# Patient Record
Sex: FEMALE | Race: WHITE
[De-identification: names, ages, dates, MRNs, and addresses within clinical notes are randomized per-mention and may not be internally consistent; named-entity substitution may affect disease eponyms.]

---

## 2018-08-27 ENCOUNTER — HOSPITAL ENCOUNTER (EMERGENCY)
Dept: HOSPITAL 92 - ERS | Age: 33
Discharge: HOME | End: 2018-08-27
Payer: SELF-PAY

## 2018-08-27 DIAGNOSIS — N83.202: Primary | ICD-10-CM

## 2018-08-27 DIAGNOSIS — F17.210: ICD-10-CM

## 2018-08-27 LAB
PREGU CONTROL BACKGROUND?: (no result)
PREGU CONTROL BAR APPEAR?: YES
SP GR UR STRIP: 1.01 (ref 1–1.04)

## 2018-08-27 PROCEDURE — 81025 URINE PREGNANCY TEST: CPT

## 2018-08-27 PROCEDURE — 81003 URINALYSIS AUTO W/O SCOPE: CPT

## 2018-08-27 PROCEDURE — 76856 US EXAM PELVIC COMPLETE: CPT

## 2018-08-27 PROCEDURE — 93976 VASCULAR STUDY: CPT

## 2018-08-27 PROCEDURE — 74176 CT ABD & PELVIS W/O CONTRAST: CPT

## 2018-08-27 NOTE — ULT
ULTRASOUND PELVIC WITH DOPPLER:

 

Date:  08/27/18 

 

HISTORY:  

Left side adnexal mass on recent CT exam. 

 

COMPARISON:  

CT dated 08/27/18. CT exam from 2009. 

 

FINDINGS:

Real-time Gray scale with color flow and spectral analysis of the pelvis performed via the transabdom
inal approach only. The uterus measures 8.8 x 4.1 x 4.7 cm. Endometrial thickness is 9.0 mm. 

 

Right ovary measures 2.7 x 2.6 x 2.2 cm. Left ovary measures 4.6 x 4.3 x 4.5 cm. In the left ovary, t
here is a 4.6 x 3.8 x 4.0 cm cystic mass with multiple thin septations which are less than 3.0 mm. 

 

IMPRESSION: 

Cystic mass left ovary with multiple thin septations. This suggests a neoplasm. Given the thin septat
ions, this may be a benign neoplastic entity. Surgical consultation recommended. 

 

 

POS: [facility]

## 2018-08-27 NOTE — CT
ABDOMEN AND PELVIS CT NONCONTRAST:

 

Date:  08/27/18 

 

Reference made to 05/02/09 CT exam. 

 

INDICATION:

Left flank pain. 

 

FINDINGS:

 

There is a large left adnexal hypodense mass, incompletely assessed by CT imaging, measuring approxim
ately 5.0 cm in diameter. There is no obstructive uropathy, bilaterally. Numerous, scattered granulom
atous calcifications are present. Visualized lung bases are clear. No acute osseous pathology. Evalua
tion otherwise limited by noncontrast technique. 

 

IMPRESSION: 

1.  No urolithiasis or obstructive uropathy. 

2.  Large left adnexal mass. Recommend dedicated pelvic ultrasound to further evaluated. 

 

 

POS: [facility]

## 2018-09-03 ENCOUNTER — HOSPITAL ENCOUNTER (EMERGENCY)
Dept: HOSPITAL 92 - ERS | Age: 33
Discharge: HOME | End: 2018-09-03
Payer: SELF-PAY

## 2018-09-03 DIAGNOSIS — F17.210: ICD-10-CM

## 2018-09-03 DIAGNOSIS — M25.562: Primary | ICD-10-CM

## 2018-09-03 PROCEDURE — 96372 THER/PROPH/DIAG INJ SC/IM: CPT

## 2018-09-03 NOTE — RAD
RADIOGRAPH LEFT KNEE FOUR VIEWS:

 

HISTORY:

A 32-year-old female with traumatic left knee pain after twisting injury.

 

FINDINGS:

No joint effusion.  Joint spaces are maintained without erosions or osteophytes.  No fracture, disloc
ation, or any other osseous abnormality.

 

IMPRESSION:

negative.

 

POS: [facility]

## 2018-10-10 ENCOUNTER — HOSPITAL ENCOUNTER (EMERGENCY)
Dept: HOSPITAL 92 - ERS | Age: 33
Discharge: HOME | End: 2018-10-10
Payer: MEDICAID

## 2018-10-10 DIAGNOSIS — O20.9: Primary | ICD-10-CM

## 2018-10-10 DIAGNOSIS — F17.210: ICD-10-CM

## 2018-10-10 DIAGNOSIS — Z3A.01: ICD-10-CM

## 2018-10-10 DIAGNOSIS — O99.331: ICD-10-CM

## 2018-10-10 LAB
BASOPHILS # BLD AUTO: 0.1 THOU/UL (ref 0–0.2)
BASOPHILS NFR BLD AUTO: 0.7 % (ref 0–1)
CRYSTAL-AUWI FLAG: 0 (ref 0–15)
EOSINOPHIL # BLD AUTO: 0.2 THOU/UL (ref 0–0.7)
EOSINOPHIL NFR BLD AUTO: 1.2 % (ref 0–10)
HEV IGM SER QL: 0.7 (ref 0–7.99)
HGB BLD-MCNC: 14.9 G/DL (ref 12–16)
HYALINE CASTS #/AREA URNS LPF: (no result) LPF
LYMPHOCYTES # BLD: 2.4 THOU/UL (ref 1.2–3.4)
LYMPHOCYTES NFR BLD AUTO: 18.3 % (ref 21–51)
MCH RBC QN AUTO: 33.8 PG (ref 27–31)
MCV RBC AUTO: 99.5 FL (ref 78–98)
MONOCYTES # BLD AUTO: 0.6 THOU/UL (ref 0.11–0.59)
MONOCYTES NFR BLD AUTO: 4.8 % (ref 0–10)
NEUTROPHILS # BLD AUTO: 9.8 THOU/UL (ref 1.4–6.5)
NEUTROPHILS NFR BLD AUTO: 75.1 % (ref 42–75)
PATHC CAST-AUWI FLAG: 0 (ref 0–2.49)
PLATELET # BLD AUTO: 238 THOU/UL (ref 130–400)
RBC # BLD AUTO: 4.39 MILL/UL (ref 4.2–5.4)
RBC UR QL AUTO: (no result) HPF (ref 0–3)
SP GR UR STRIP: 1.01 (ref 1–1.04)
SPERM-AUWI FLAG: 0 (ref 0–9.9)
WBC # BLD AUTO: 13 THOU/UL (ref 4.8–10.8)
YEAST-AUWI FLAG: 0 (ref 0–25)

## 2018-10-10 PROCEDURE — 86850 RBC ANTIBODY SCREEN: CPT

## 2018-10-10 PROCEDURE — 36415 COLL VENOUS BLD VENIPUNCTURE: CPT

## 2018-10-10 PROCEDURE — 81003 URINALYSIS AUTO W/O SCOPE: CPT

## 2018-10-10 PROCEDURE — 86901 BLOOD TYPING SEROLOGIC RH(D): CPT

## 2018-10-10 PROCEDURE — 81015 MICROSCOPIC EXAM OF URINE: CPT

## 2018-10-10 PROCEDURE — 76856 US EXAM PELVIC COMPLETE: CPT

## 2018-10-10 PROCEDURE — 85025 COMPLETE CBC W/AUTO DIFF WBC: CPT

## 2018-10-10 PROCEDURE — 86900 BLOOD TYPING SEROLOGIC ABO: CPT

## 2018-10-10 PROCEDURE — 84702 CHORIONIC GONADOTROPIN TEST: CPT

## 2018-10-10 NOTE — ULT
PELVIC ULTRASOUND:

 

Date:  10/10/18 

 

HISTORY:  

Patient is reportedly pregnant and has bleeding. 

 

FINDINGS:

Real-time imaging of the pelvis was obtained transabdominally, as well as with an endovaginal probe. 
The uterus measures 7.7 cm in length. The endometrium is in the 6-7 mm range. No intrauterine gestati
onal sac is identified. 

 

The right ovary is normal in size and appearance. The left ovary is not visualized. 

 

DOPPLER EVALUATION WITH SPECTRAL ANALYSIS:

Normal flow is shown to the right ovary. 

 

IMPRESSION: 

1.  Nonvisualization of the left ovary. 

2.  No evidence of an intrauterine pregnancy. This does not exclude the possibility of an early intra
uterine or ectopic. No free fluid is seen, or other findings. 

 

 

POS: [facility]

## 2019-03-29 ENCOUNTER — HOSPITAL ENCOUNTER (EMERGENCY)
Dept: HOSPITAL 92 - ERS | Age: 34
Discharge: HOME | End: 2019-03-29
Payer: COMMERCIAL

## 2019-03-29 DIAGNOSIS — F17.210: ICD-10-CM

## 2019-03-29 DIAGNOSIS — Z00.00: Primary | ICD-10-CM

## 2019-03-29 PROCEDURE — 99281 EMR DPT VST MAYX REQ PHY/QHP: CPT

## 2019-11-27 ENCOUNTER — HOSPITAL ENCOUNTER (EMERGENCY)
Dept: HOSPITAL 92 - ERS | Age: 34
Discharge: HOME | End: 2019-11-27
Payer: SELF-PAY
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
## 2019-11-27 DIAGNOSIS — E87.6: ICD-10-CM

## 2019-11-27 DIAGNOSIS — S93.402A: Primary | ICD-10-CM

## 2019-11-27 NOTE — RAD
EXAM: 3 views of the left ankle



HISTORY: Ankle pain



COMPARISON: None



FINDINGS: 3 views of the left ankle shows no evidence of acute fracture or dislocation. Mild lateral 
soft tissue swelling is seen. No degenerative changes are present.



IMPRESSION: No evidence of acute osseous abnormality.



Reported By: Guillermo Crow 

Electronically Signed:  11/27/2019 9:47 AM

## 2020-08-07 ENCOUNTER — HOSPITAL ENCOUNTER (EMERGENCY)
Dept: HOSPITAL 57 - BURERS | Age: 35
Discharge: HOME | End: 2020-08-07
Payer: SELF-PAY

## 2020-08-07 DIAGNOSIS — S92.421A: Primary | ICD-10-CM
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
## 2020-08-08 NOTE — RAD
RIGHT FOOT 3 VIEWS:

 

DATE: 8/7/2020.

 

FINDINGS: 

There is a mildly comminuted fracture of the distal phalanx of the great toe.  There are mild displac
ement fragments.  The IP joint does not appear to be involved.  The remainder of the foot appears int
act.

 

IMPRESSION: 

Displaced fracture of the distal phalanx of the great toe.

 

POS: [facility]

## 2021-09-14 ENCOUNTER — HOSPITAL ENCOUNTER (OUTPATIENT)
Dept: HOSPITAL 92 - CSHULT | Age: 36
Discharge: HOME | End: 2021-09-14
Attending: NURSE PRACTITIONER
Payer: COMMERCIAL

## 2021-09-14 DIAGNOSIS — O02.81: Primary | ICD-10-CM

## 2021-09-14 DIAGNOSIS — N83.202: ICD-10-CM

## 2021-09-14 PROCEDURE — 76856 US EXAM PELVIC COMPLETE: CPT

## 2022-01-03 ENCOUNTER — HOSPITAL ENCOUNTER (EMERGENCY)
Dept: HOSPITAL 92 - CSHERS | Age: 37
Discharge: LEFT BEFORE DISCHARGE | End: 2022-01-03
Payer: COMMERCIAL

## 2022-01-03 DIAGNOSIS — Z53.21: Primary | ICD-10-CM

## 2022-01-03 LAB
ALBUMIN SERPL BCG-MCNC: 4.6 G/DL (ref 3.5–5)
ALP SERPL-CCNC: 66 U/L (ref 40–110)
ALT SERPL W P-5'-P-CCNC: 13 U/L (ref 8–55)
ANION GAP SERPL CALC-SCNC: 13 MMOL/L (ref 10–20)
AST SERPL-CCNC: 20 U/L (ref 5–34)
BASOPHILS # BLD AUTO: 0.1 10X3/UL (ref 0–0.2)
BASOPHILS NFR BLD AUTO: 0.3 % (ref 0–2)
BILIRUB SERPL-MCNC: 0.6 MG/DL (ref 0.2–1.2)
BUN SERPL-MCNC: 8 MG/DL (ref 7–18.7)
CALCIUM SERPL-MCNC: 9.2 MG/DL (ref 7.8–10.44)
CHLORIDE SERPL-SCNC: 103 MMOL/L (ref 98–107)
CO2 SERPL-SCNC: 27 MMOL/L (ref 22–29)
CREAT CL PREDICTED SERPL C-G-VRATE: 0 ML/MIN (ref 70–130)
EOSINOPHIL # BLD AUTO: 0.1 10X3/UL (ref 0–0.5)
EOSINOPHIL NFR BLD AUTO: 0.5 % (ref 0–6)
GLOBULIN SER CALC-MCNC: 2.9 G/DL (ref 2.4–3.5)
GLUCOSE SERPL-MCNC: 120 MG/DL (ref 70–105)
HGB BLD-MCNC: 12.9 G/DL (ref 12–15.5)
LYMPHOCYTES NFR BLD AUTO: 8.5 % (ref 18–47)
MCH RBC QN AUTO: 32.8 PG (ref 27–33)
MCV RBC AUTO: 98.2 FL (ref 81.6–98.3)
MONOCYTES # BLD AUTO: 0.9 10X3/UL (ref 0–1.1)
MONOCYTES NFR BLD AUTO: 4.6 % (ref 0–10)
NEUTROPHILS # BLD AUTO: 16.6 10X3/UL (ref 1.5–8.4)
NEUTROPHILS NFR BLD AUTO: 85.7 % (ref 40–75)
PLATELET # BLD AUTO: 206 10X3/UL (ref 150–450)
POTASSIUM SERPL-SCNC: 4.5 MMOL/L (ref 3.5–5.1)
RBC # BLD AUTO: 3.93 10X6/UL (ref 3.9–5.03)
SODIUM SERPL-SCNC: 138 MMOL/L (ref 136–145)
WBC # BLD AUTO: 19.3 10X3/UL (ref 3.5–10.5)

## 2022-01-03 PROCEDURE — 80053 COMPREHEN METABOLIC PANEL: CPT

## 2022-01-03 PROCEDURE — 85025 COMPLETE CBC W/AUTO DIFF WBC: CPT

## 2022-01-04 ENCOUNTER — HOSPITAL ENCOUNTER (OUTPATIENT)
Dept: HOSPITAL 92 - ERS | Age: 37
Setting detail: OBSERVATION
LOS: 1 days | Discharge: HOME | End: 2022-01-05
Attending: SURGERY | Admitting: SURGERY
Payer: COMMERCIAL

## 2022-01-04 DIAGNOSIS — F17.290: ICD-10-CM

## 2022-01-04 DIAGNOSIS — K35.80: Primary | ICD-10-CM

## 2022-01-04 DIAGNOSIS — Z20.822: ICD-10-CM

## 2022-01-04 LAB
LEUKOCYTE ESTERASE UR QL STRIP.AUTO: 250 LEU/UL
PREGU CONTROL BACKGROUND?: (no result)
PREGU CONTROL BAR APPEAR?: YES
RBC UR QL AUTO: (no result) HPF (ref 0–3)
SP GR UR STRIP: 1.02 (ref 1–1.04)
WBC UR QL AUTO: (no result) HPF (ref 0–3)

## 2022-01-04 PROCEDURE — 81015 MICROSCOPIC EXAM OF URINE: CPT

## 2022-01-04 PROCEDURE — 87491 CHLMYD TRACH DNA AMP PROBE: CPT

## 2022-01-04 PROCEDURE — 87480 CANDIDA DNA DIR PROBE: CPT

## 2022-01-04 PROCEDURE — A4649 SURGICAL SUPPLIES: HCPCS

## 2022-01-04 PROCEDURE — 87660 TRICHOMONAS VAGIN DIR PROBE: CPT

## 2022-01-04 PROCEDURE — 87591 N.GONORRHOEAE DNA AMP PROB: CPT

## 2022-01-04 PROCEDURE — 87510 GARDNER VAG DNA DIR PROBE: CPT

## 2022-01-04 PROCEDURE — U0002 COVID-19 LAB TEST NON-CDC: HCPCS

## 2022-01-04 PROCEDURE — 81003 URINALYSIS AUTO W/O SCOPE: CPT

## 2022-01-04 PROCEDURE — 74177 CT ABD & PELVIS W/CONTRAST: CPT

## 2022-01-04 PROCEDURE — 81025 URINE PREGNANCY TEST: CPT

## 2022-01-04 PROCEDURE — S0020 INJECTION, BUPIVICAINE HYDRO: HCPCS

## 2022-01-04 PROCEDURE — 88304 TISSUE EXAM BY PATHOLOGIST: CPT

## 2022-01-04 PROCEDURE — G0378 HOSPITAL OBSERVATION PER HR: HCPCS

## 2022-01-05 PROCEDURE — 0DTJ4ZZ RESECTION OF APPENDIX, PERCUTANEOUS ENDOSCOPIC APPROACH: ICD-10-PCS | Performed by: SURGERY

## 2022-09-26 ENCOUNTER — HOSPITAL ENCOUNTER (EMERGENCY)
Dept: HOSPITAL 92 - CSHERS | Age: 37
Discharge: HOME | End: 2022-09-26
Payer: COMMERCIAL

## 2022-09-26 DIAGNOSIS — O20.8: Primary | ICD-10-CM

## 2022-09-26 DIAGNOSIS — R82.71: ICD-10-CM

## 2022-09-26 DIAGNOSIS — O23.91: ICD-10-CM

## 2022-09-26 DIAGNOSIS — Z3A.12: ICD-10-CM

## 2022-09-26 LAB
ALBUMIN SERPL BCG-MCNC: 3.9 G/DL (ref 3.5–5)
ALP SERPL-CCNC: 60 U/L (ref 40–110)
ALT SERPL W P-5'-P-CCNC: 11 U/L (ref 8–55)
ANION GAP SERPL CALC-SCNC: 12 MMOL/L (ref 10–20)
AST SERPL-CCNC: 17 U/L (ref 5–34)
BACTERIA UR QL AUTO: (no result) HPF
BASOPHILS # BLD AUTO: 0.1 10X3/UL (ref 0–0.2)
BASOPHILS NFR BLD AUTO: 0.6 % (ref 0–2)
BILIRUB SERPL-MCNC: 0.8 MG/DL (ref 0.2–1.2)
BUN SERPL-MCNC: 5 MG/DL (ref 7–18.7)
CALCIUM SERPL-MCNC: 9 MG/DL (ref 7.8–10.44)
CHLORIDE SERPL-SCNC: 104 MMOL/L (ref 98–107)
CO2 SERPL-SCNC: 25 MMOL/L (ref 22–29)
CREAT CL PREDICTED SERPL C-G-VRATE: 0 ML/MIN (ref 70–130)
EOSINOPHIL # BLD AUTO: 0.2 10X3/UL (ref 0–0.5)
EOSINOPHIL NFR BLD AUTO: 1.9 % (ref 0–6)
GLOBULIN SER CALC-MCNC: 2.9 G/DL (ref 2.4–3.5)
GLUCOSE SERPL-MCNC: 85 MG/DL (ref 70–105)
HGB BLD-MCNC: 12.4 G/DL (ref 12–15.5)
LYMPHOCYTES NFR BLD AUTO: 22 % (ref 18–47)
MCH RBC QN AUTO: 33.1 PG (ref 27–33)
MCV RBC AUTO: 93.9 FL (ref 81.6–98.3)
MONOCYTES # BLD AUTO: 0.6 10X3/UL (ref 0–1.1)
MONOCYTES NFR BLD AUTO: 7.1 % (ref 0–10)
NEUTROPHILS # BLD AUTO: 5.9 10X3/UL (ref 1.5–8.4)
NEUTROPHILS NFR BLD AUTO: 67.8 % (ref 40–75)
PLATELET # BLD AUTO: 226 10X3/UL (ref 150–450)
POTASSIUM SERPL-SCNC: 4.1 MMOL/L (ref 3.5–5.1)
RBC # BLD AUTO: 3.75 10X6/UL (ref 3.9–5.03)
RBC UR QL AUTO: (no result) HPF (ref 0–3)
SODIUM SERPL-SCNC: 137 MMOL/L (ref 136–145)
SP GR UR STRIP: 1.01 (ref 1–1.03)
WBC # BLD AUTO: 8.7 10X3/UL (ref 3.5–10.5)
WBC UR QL AUTO: (no result) HPF (ref 0–3)

## 2022-09-26 PROCEDURE — 36415 COLL VENOUS BLD VENIPUNCTURE: CPT

## 2022-09-26 PROCEDURE — 76856 US EXAM PELVIC COMPLETE: CPT

## 2022-09-26 PROCEDURE — 85025 COMPLETE CBC W/AUTO DIFF WBC: CPT

## 2022-09-26 PROCEDURE — 86900 BLOOD TYPING SEROLOGIC ABO: CPT

## 2022-09-26 PROCEDURE — 80053 COMPREHEN METABOLIC PANEL: CPT

## 2022-09-26 PROCEDURE — 84702 CHORIONIC GONADOTROPIN TEST: CPT

## 2022-09-26 PROCEDURE — 94760 N-INVAS EAR/PLS OXIMETRY 1: CPT

## 2022-09-26 PROCEDURE — 81015 MICROSCOPIC EXAM OF URINE: CPT

## 2022-09-26 PROCEDURE — 81003 URINALYSIS AUTO W/O SCOPE: CPT

## 2022-09-26 PROCEDURE — 86901 BLOOD TYPING SEROLOGIC RH(D): CPT

## 2022-10-02 ENCOUNTER — HOSPITAL ENCOUNTER (EMERGENCY)
Dept: HOSPITAL 92 - CSHERS | Age: 37
LOS: 1 days | Discharge: HOME | End: 2022-10-03
Payer: COMMERCIAL

## 2022-10-02 DIAGNOSIS — O99.891: Primary | ICD-10-CM

## 2022-10-02 DIAGNOSIS — R10.31: ICD-10-CM

## 2022-10-02 DIAGNOSIS — Z3A.12: ICD-10-CM

## 2022-10-02 DIAGNOSIS — R10.11: ICD-10-CM

## 2022-10-02 LAB
BASOPHILS # BLD AUTO: 0 10X3/UL (ref 0–0.2)
BASOPHILS NFR BLD AUTO: 0.2 % (ref 0–2)
EOSINOPHIL # BLD AUTO: 0.2 10X3/UL (ref 0–0.5)
EOSINOPHIL NFR BLD AUTO: 1.6 % (ref 0–6)
HGB BLD-MCNC: 11 G/DL (ref 12–15.5)
LYMPHOCYTES NFR BLD AUTO: 13 % (ref 18–47)
MCH RBC QN AUTO: 33.1 PG (ref 27–33)
MCV RBC AUTO: 94 FL (ref 81.6–98.3)
MONOCYTES # BLD AUTO: 0.8 10X3/UL (ref 0–1.1)
MONOCYTES NFR BLD AUTO: 5.9 % (ref 0–10)
NEUTROPHILS # BLD AUTO: 10.6 10X3/UL (ref 1.5–8.4)
NEUTROPHILS NFR BLD AUTO: 78.6 % (ref 40–75)
PLATELET # BLD AUTO: 221 10X3/UL (ref 150–450)
RBC # BLD AUTO: 3.32 10X6/UL (ref 3.9–5.03)
WBC # BLD AUTO: 13.5 10X3/UL (ref 3.5–10.5)

## 2022-10-02 PROCEDURE — 83690 ASSAY OF LIPASE: CPT

## 2022-10-02 PROCEDURE — 85025 COMPLETE CBC W/AUTO DIFF WBC: CPT

## 2022-10-02 PROCEDURE — 81003 URINALYSIS AUTO W/O SCOPE: CPT

## 2022-10-02 PROCEDURE — 82550 ASSAY OF CK (CPK): CPT

## 2022-10-02 PROCEDURE — 76856 US EXAM PELVIC COMPLETE: CPT

## 2022-10-02 PROCEDURE — 80053 COMPREHEN METABOLIC PANEL: CPT

## 2022-10-02 PROCEDURE — 76705 ECHO EXAM OF ABDOMEN: CPT

## 2022-10-02 PROCEDURE — 93976 VASCULAR STUDY: CPT

## 2022-10-02 PROCEDURE — 81015 MICROSCOPIC EXAM OF URINE: CPT

## 2022-10-03 LAB
ALBUMIN SERPL BCG-MCNC: 3.4 G/DL (ref 3.5–5)
ALP SERPL-CCNC: 84 U/L (ref 40–110)
ALT SERPL W P-5'-P-CCNC: 12 U/L (ref 8–55)
ANION GAP SERPL CALC-SCNC: 12 MMOL/L (ref 10–20)
AST SERPL-CCNC: 18 U/L (ref 5–34)
BILIRUB SERPL-MCNC: 0.3 MG/DL (ref 0.2–1.2)
BUN SERPL-MCNC: 7 MG/DL (ref 7–18.7)
CALCIUM SERPL-MCNC: 8.7 MG/DL (ref 7.8–10.44)
CHLORIDE SERPL-SCNC: 105 MMOL/L (ref 98–107)
CK SERPL-CCNC: 21 U/L (ref 29–168)
CO2 SERPL-SCNC: 23 MMOL/L (ref 22–29)
CREAT CL PREDICTED SERPL C-G-VRATE: 0 ML/MIN (ref 70–130)
GLOBULIN SER CALC-MCNC: 2.9 G/DL (ref 2.4–3.5)
GLUCOSE SERPL-MCNC: 81 MG/DL (ref 70–105)
LEUKOCYTE ESTERASE UR QL STRIP.AUTO: 100
LIPASE SERPL-CCNC: 12 U/L (ref 8–78)
POTASSIUM SERPL-SCNC: 4.2 MMOL/L (ref 3.5–5.1)
RBC UR QL AUTO: (no result) HPF (ref 0–3)
SODIUM SERPL-SCNC: 136 MMOL/L (ref 136–145)
SP GR UR STRIP: 1.02 (ref 1–1.03)
WBC UR QL AUTO: (no result) HPF (ref 0–3)

## 2022-10-06 ENCOUNTER — HOSPITAL ENCOUNTER (EMERGENCY)
Dept: HOSPITAL 92 - CSHERS | Age: 37
Discharge: HOME | End: 2022-10-06
Payer: COMMERCIAL

## 2022-10-06 ENCOUNTER — HOSPITAL ENCOUNTER (EMERGENCY)
Dept: HOSPITAL 57 - BURERS | Age: 37
Discharge: HOME | End: 2022-10-06
Payer: COMMERCIAL

## 2022-10-06 DIAGNOSIS — R50.9: ICD-10-CM

## 2022-10-06 DIAGNOSIS — F17.290: ICD-10-CM

## 2022-10-06 DIAGNOSIS — O20.0: Primary | ICD-10-CM

## 2022-10-06 DIAGNOSIS — R05.9: Primary | ICD-10-CM

## 2022-10-06 DIAGNOSIS — Z3A.13: ICD-10-CM

## 2022-10-06 DIAGNOSIS — O99.333: ICD-10-CM

## 2022-10-06 DIAGNOSIS — B34.9: ICD-10-CM

## 2022-10-06 DIAGNOSIS — O98.511: ICD-10-CM

## 2022-10-06 DIAGNOSIS — Z20.822: ICD-10-CM

## 2022-10-06 LAB
APAP SERPL-MCNC: (no result) MCG/ML (ref 10–30)
BACTERIA UR QL AUTO: (no result) HPF
BASOPHILS # BLD AUTO: 0 10X3/UL (ref 0–0.2)
BASOPHILS NFR BLD AUTO: 0.3 % (ref 0–2)
EOSINOPHIL # BLD AUTO: 0.1 10X3/UL (ref 0–0.5)
EOSINOPHIL NFR BLD AUTO: 0.8 % (ref 0–6)
HGB BLD-MCNC: 11 G/DL (ref 12–15.5)
LEUKOCYTE ESTERASE UR QL STRIP.AUTO: 25
LYMPHOCYTES NFR BLD AUTO: 5 % (ref 18–47)
MCH RBC QN AUTO: 33.2 PG (ref 27–33)
MCV RBC AUTO: 94 FL (ref 81.6–98.3)
MONOCYTES # BLD AUTO: 0.1 10X3/UL (ref 0–1.1)
MONOCYTES NFR BLD AUTO: 1.3 % (ref 0–10)
NEUTROPHILS # BLD AUTO: 7 10X3/UL (ref 1.5–8.4)
NEUTROPHILS NFR BLD AUTO: 91.4 % (ref 40–75)
PLATELET # BLD AUTO: 198 10X3/UL (ref 150–450)
RBC # BLD AUTO: 3.31 10X6/UL (ref 3.9–5.03)
RBC UR QL AUTO: (no result) HPF (ref 0–3)
SALICYLATES SERPL-MCNC: (no result) MG/DL (ref 15–30)
SP GR UR STRIP: 1 (ref 1–1.03)
WBC # BLD AUTO: 7.7 10X3/UL (ref 3.5–10.5)
WBC UR QL AUTO: (no result) HPF (ref 0–3)

## 2022-10-06 PROCEDURE — U0005 INFEC AGEN DETEC AMPLI PROBE: HCPCS

## 2022-10-06 PROCEDURE — 96361 HYDRATE IV INFUSION ADD-ON: CPT

## 2022-10-06 PROCEDURE — 96360 HYDRATION IV INFUSION INIT: CPT

## 2022-10-06 PROCEDURE — 87804 INFLUENZA ASSAY W/OPTIC: CPT

## 2022-10-06 PROCEDURE — 87480 CANDIDA DNA DIR PROBE: CPT

## 2022-10-06 PROCEDURE — U0003 INFECTIOUS AGENT DETECTION BY NUCLEIC ACID (DNA OR RNA); SEVERE ACUTE RESPIRATORY SYNDROME CORONAVIRUS 2 (SARS-COV-2) (CORONAVIRUS DISEASE [COVID-19]), AMPLIFIED PROBE TECHNIQUE, MAKING USE OF HIGH THROUGHPUT TECHNOLOGIES AS DESCRIBED BY CMS-2020-01-R: HCPCS

## 2022-10-06 PROCEDURE — 36415 COLL VENOUS BLD VENIPUNCTURE: CPT

## 2022-10-06 PROCEDURE — 87510 GARDNER VAG DNA DIR PROBE: CPT

## 2022-10-06 PROCEDURE — 94760 N-INVAS EAR/PLS OXIMETRY 1: CPT

## 2022-10-06 PROCEDURE — 87660 TRICHOMONAS VAGIN DIR PROBE: CPT

## 2022-10-06 PROCEDURE — 81003 URINALYSIS AUTO W/O SCOPE: CPT

## 2022-10-06 PROCEDURE — 85025 COMPLETE CBC W/AUTO DIFF WBC: CPT

## 2022-10-06 PROCEDURE — 80307 DRUG TEST PRSMV CHEM ANLYZR: CPT

## 2022-10-06 PROCEDURE — 86900 BLOOD TYPING SEROLOGIC ABO: CPT

## 2022-10-06 PROCEDURE — 86901 BLOOD TYPING SEROLOGIC RH(D): CPT

## 2022-10-06 PROCEDURE — 87591 N.GONORRHOEAE DNA AMP PROB: CPT

## 2022-10-06 PROCEDURE — 87491 CHLMYD TRACH DNA AMP PROBE: CPT

## 2022-10-06 PROCEDURE — 99283 EMERGENCY DEPT VISIT LOW MDM: CPT

## 2022-10-06 PROCEDURE — 81015 MICROSCOPIC EXAM OF URINE: CPT

## 2022-10-23 ENCOUNTER — HOSPITAL ENCOUNTER (EMERGENCY)
Dept: HOSPITAL 92 - CSHERS | Age: 37
LOS: 1 days | Discharge: HOME | End: 2022-10-24
Payer: COMMERCIAL

## 2022-10-23 DIAGNOSIS — O03.9: Primary | ICD-10-CM

## 2022-10-23 PROCEDURE — 76815 OB US LIMITED FETUS(S): CPT

## 2022-10-23 PROCEDURE — 88305 TISSUE EXAM BY PATHOLOGIST: CPT

## 2022-10-26 ENCOUNTER — HOSPITAL ENCOUNTER (INPATIENT)
Dept: HOSPITAL 92 - CSHERS | Age: 37
LOS: 1 days | Discharge: HOME | DRG: 779 | End: 2022-10-27
Attending: OBSTETRICS & GYNECOLOGY | Admitting: OBSTETRICS & GYNECOLOGY
Payer: COMMERCIAL

## 2022-10-26 DIAGNOSIS — Z79.899: ICD-10-CM

## 2022-10-26 DIAGNOSIS — D50.0: ICD-10-CM

## 2022-10-26 DIAGNOSIS — O30.042: ICD-10-CM

## 2022-10-26 DIAGNOSIS — Z90.49: ICD-10-CM

## 2022-10-26 DIAGNOSIS — O02.1: Primary | ICD-10-CM

## 2022-10-26 DIAGNOSIS — Z20.822: ICD-10-CM

## 2022-10-26 DIAGNOSIS — Z90.89: ICD-10-CM

## 2022-10-26 LAB
ALBUMIN SERPL BCG-MCNC: 3.1 G/DL (ref 3.5–5)
ALP SERPL-CCNC: 86 U/L (ref 40–110)
ALT SERPL W P-5'-P-CCNC: 9 U/L (ref 8–55)
ANION GAP SERPL CALC-SCNC: 12 MMOL/L (ref 10–20)
AST SERPL-CCNC: 14 U/L (ref 5–34)
BILIRUB SERPL-MCNC: 0.2 MG/DL (ref 0.2–1.2)
BUN SERPL-MCNC: 7 MG/DL (ref 7–18.7)
CALCIUM SERPL-MCNC: 8.4 MG/DL (ref 7.8–10.44)
CHLORIDE SERPL-SCNC: 106 MMOL/L (ref 98–107)
CO2 SERPL-SCNC: 22 MMOL/L (ref 22–29)
CREAT CL PREDICTED SERPL C-G-VRATE: 0 ML/MIN (ref 70–130)
DRUG SCREEN CUTOFF: (no result)
GLOBULIN SER CALC-MCNC: 2.8 G/DL (ref 2.4–3.5)
GLUCOSE SERPL-MCNC: 79 MG/DL (ref 70–105)
HBSAG INDEX: 0.19 S/CO (ref 0–0.99)
HGB BLD-MCNC: 10.3 G/DL (ref 12–15.5)
MCH RBC QN AUTO: 33 PG (ref 27–33)
MCV RBC AUTO: 93.6 FL (ref 81.6–98.3)
PLATELET # BLD AUTO: 309 10X3/UL (ref 150–450)
POTASSIUM SERPL-SCNC: 3.7 MMOL/L (ref 3.5–5.1)
RBC # BLD AUTO: 3.12 10X6/UL (ref 3.9–5.03)
SODIUM SERPL-SCNC: 136 MMOL/L (ref 136–145)
SYPHILIS ANTIBODY INDEX: 0.05 S/CO
WBC # BLD AUTO: 9.6 10X3/UL (ref 3.5–10.5)

## 2022-10-26 PROCEDURE — 99285 EMERGENCY DEPT VISIT HI MDM: CPT

## 2022-10-26 PROCEDURE — 85025 COMPLETE CBC W/AUTO DIFF WBC: CPT

## 2022-10-26 PROCEDURE — 86900 BLOOD TYPING SEROLOGIC ABO: CPT

## 2022-10-26 PROCEDURE — 86850 RBC ANTIBODY SCREEN: CPT

## 2022-10-26 PROCEDURE — 85027 COMPLETE CBC AUTOMATED: CPT

## 2022-10-26 PROCEDURE — U0002 COVID-19 LAB TEST NON-CDC: HCPCS

## 2022-10-26 PROCEDURE — 99284 EMERGENCY DEPT VISIT MOD MDM: CPT

## 2022-10-26 PROCEDURE — 36415 COLL VENOUS BLD VENIPUNCTURE: CPT

## 2022-10-26 PROCEDURE — 80053 COMPREHEN METABOLIC PANEL: CPT

## 2022-10-26 PROCEDURE — 80306 DRUG TEST PRSMV INSTRMNT: CPT

## 2022-10-26 PROCEDURE — 86780 TREPONEMA PALLIDUM: CPT

## 2022-10-26 PROCEDURE — 87340 HEPATITIS B SURFACE AG IA: CPT

## 2022-10-26 PROCEDURE — 86901 BLOOD TYPING SEROLOGIC RH(D): CPT

## 2022-10-27 LAB
BASOPHILS # BLD AUTO: 0.1 10X3/UL (ref 0–0.2)
BASOPHILS NFR BLD AUTO: 0.6 % (ref 0–2)
EOSINOPHIL # BLD AUTO: 0.1 10X3/UL (ref 0–0.5)
EOSINOPHIL NFR BLD AUTO: 1.6 % (ref 0–6)
HGB BLD-MCNC: 8.3 G/DL (ref 12–15.5)
LYMPHOCYTES NFR BLD AUTO: 21 % (ref 18–47)
MCH RBC QN AUTO: 33.3 PG (ref 27–33)
MCV RBC AUTO: 95.2 FL (ref 81.6–98.3)
MONOCYTES # BLD AUTO: 0.9 10X3/UL (ref 0–1.1)
MONOCYTES NFR BLD AUTO: 10.9 % (ref 0–10)
NEUTROPHILS # BLD AUTO: 5.1 10X3/UL (ref 1.5–8.4)
NEUTROPHILS NFR BLD AUTO: 64.8 % (ref 40–75)
PLATELET # BLD AUTO: 270 10X3/UL (ref 150–450)
RBC # BLD AUTO: 2.49 10X6/UL (ref 3.9–5.03)
WBC # BLD AUTO: 7.9 10X3/UL (ref 3.5–10.5)

## 2023-08-20 ENCOUNTER — HOSPITAL ENCOUNTER (EMERGENCY)
Dept: HOSPITAL 92 - ERS | Age: 38
Discharge: HOME | End: 2023-08-20
Payer: COMMERCIAL

## 2023-08-20 DIAGNOSIS — R42: Primary | ICD-10-CM

## 2023-08-20 DIAGNOSIS — Z87.891: ICD-10-CM

## 2023-08-20 LAB
ALBUMIN SERPL BCG-MCNC: 4.3 G/DL (ref 3.5–5)
ALP SERPL-CCNC: 80 U/L (ref 40–110)
ALT SERPL W P-5'-P-CCNC: 19 U/L (ref 8–55)
ANION GAP SERPL CALC-SCNC: 12 MMOL/L (ref 10–20)
AST SERPL-CCNC: 26 U/L (ref 5–34)
BASOPHILS # BLD AUTO: 0 THOU/UL (ref 0–0.2)
BASOPHILS NFR BLD AUTO: 0.6 % (ref 0–1)
BILIRUB SERPL-MCNC: 0.8 MG/DL (ref 0.2–1.2)
BUN SERPL-MCNC: 11 MG/DL (ref 7–18.7)
CALCIUM SERPL-MCNC: 9.5 MG/DL (ref 7.8–10.44)
CAUTI INDICATIONS FOR CULTURE: (no result)
CHLORIDE SERPL-SCNC: 103 MMOL/L (ref 98–107)
CO2 SERPL-SCNC: 27 MMOL/L (ref 22–29)
CREAT CL PREDICTED SERPL C-G-VRATE: 0 ML/MIN (ref 70–130)
EOSINOPHIL # BLD AUTO: 0.1 THOU/UL (ref 0–0.7)
EOSINOPHIL NFR BLD AUTO: 1.6 % (ref 0–10)
GLOBULIN SER CALC-MCNC: 2.9 G/DL (ref 2.4–3.5)
GLUCOSE SERPL-MCNC: 91 MG/DL (ref 70–105)
HCT VFR BLD CALC: 36.8 % (ref 36–47)
HGB BLD-MCNC: 12.4 G/DL (ref 12–16)
LYMPHOCYTES NFR BLD AUTO: 21.2 % (ref 21–51)
MCH RBC QN AUTO: 31.7 PG (ref 27–31)
MCV RBC AUTO: 94.1 FL (ref 78–98)
MONOCYTES # BLD AUTO: 0.4 THOU/UL (ref 0.11–0.59)
MONOCYTES NFR BLD AUTO: 6 % (ref 0–10)
NEUTROPHILS # BLD AUTO: 4.7 THOU/UL (ref 1.4–6.5)
NEUTROPHILS NFR BLD AUTO: 70.3 % (ref 42–75)
PLATELET # BLD AUTO: 217 10X3/UL (ref 130–400)
POTASSIUM SERPL-SCNC: 4.1 MMOL/L (ref 3.5–5.1)
PREGU CONTROL BACKGROUND?: (no result)
PREGU CONTROL BAR APPEAR?: YES
RBC # BLD AUTO: 3.91 MILL/UL (ref 4.2–5.4)
RBC UR QL AUTO: (no result) HPF (ref 0–3)
SODIUM SERPL-SCNC: 138 MMOL/L (ref 136–145)
SP GR UR STRIP: 1.01 (ref 1–1.04)
TROPONIN I SERPL DL<=0.01 NG/ML-MCNC: (no result) NG/ML (ref ?–0.03)
WBC # BLD AUTO: 6.7 10X3/UL (ref 4.8–10.8)
WBC UR QL AUTO: (no result) HPF (ref 0–3)

## 2023-08-20 PROCEDURE — 81001 URINALYSIS AUTO W/SCOPE: CPT

## 2023-08-20 PROCEDURE — 70498 CT ANGIOGRAPHY NECK: CPT

## 2023-08-20 PROCEDURE — 93005 ELECTROCARDIOGRAM TRACING: CPT

## 2023-08-20 PROCEDURE — 70450 CT HEAD/BRAIN W/O DYE: CPT

## 2023-08-20 PROCEDURE — 70496 CT ANGIOGRAPHY HEAD: CPT

## 2023-08-20 PROCEDURE — 81025 URINE PREGNANCY TEST: CPT

## 2023-08-20 PROCEDURE — 80053 COMPREHEN METABOLIC PANEL: CPT

## 2023-08-20 PROCEDURE — 85025 COMPLETE CBC W/AUTO DIFF WBC: CPT

## 2023-08-20 PROCEDURE — 84484 ASSAY OF TROPONIN QUANT: CPT

## 2023-08-20 PROCEDURE — 83880 ASSAY OF NATRIURETIC PEPTIDE: CPT

## 2023-08-20 PROCEDURE — 96374 THER/PROPH/DIAG INJ IV PUSH: CPT

## 2023-09-01 ENCOUNTER — HOSPITAL ENCOUNTER (EMERGENCY)
Dept: HOSPITAL 92 - ERS | Age: 38
Discharge: HOME | End: 2023-09-01
Payer: COMMERCIAL

## 2023-09-01 DIAGNOSIS — U07.1: Primary | ICD-10-CM

## 2023-09-01 PROCEDURE — 99283 EMERGENCY DEPT VISIT LOW MDM: CPT
